# Patient Record
Sex: FEMALE | Race: WHITE | NOT HISPANIC OR LATINO | Employment: PART TIME | URBAN - METROPOLITAN AREA
[De-identification: names, ages, dates, MRNs, and addresses within clinical notes are randomized per-mention and may not be internally consistent; named-entity substitution may affect disease eponyms.]

---

## 2023-01-10 NOTE — PROGRESS NOTES
Assessment/Plan:  Pap every 3 years if normal, sexually transmitted infection testing as indicated, exercise most days of week, obtain appropriate diet and hydration, Calcium 1000mg + 600 vit D daily,  Annual mammogram starting at age 34, monthly breast self exam   Check if TSH done if not call and I will order  Constipation probiotic CHASTITY increase water Senna if needed          Diagnoses and all orders for this visit:    Encounter for gynecological examination without abnormal finding  -     Cancel: IGP, Aptima HPV, Rfx 16/18,45  -     IGP, Aptima HPV, Rfx 16/18,45    Encounter for Papanicolaou smear for cervical cancer screening  -     Cancel: IGP, Aptima HPV, Rfx 16/18,45  -     IGP, Aptima HPV, Rfx 16/18,45    FH: ovarian cancer    Breast cancer screening by mammogram  -     Mammo screening bilateral w 3d & cad; Future    Other orders  -     Liquid-based pap, screening  -     escitalopram (LEXAPRO) 10 mg tablet; Take 10 mg by mouth daily  -     Magnesium 200 MG CHEW; every 24 hours  -     Multiple Vitamins-Minerals (Multivitamin Women) TABS; Take by mouth  -     fluticasone (FLONASE) 50 mcg/act nasal spray; 1 spray into each nostril if needed for rhinitis          Subjective:      Patient ID: Feliciano Urbina is a 42 y o  female  New/ former pt here for annual gyn  (7 & 5) H/o LEEP in  normal paps since  Periods monthly regular  Gets menstrual headaches Not interested in bc  Vasectomy  Denies abd or pelvic pain  Constipation Bladder normal Weight gain  FH ovarian cancer MGM from [facility] PAP 2020 neg/neg       The following portions of the patient's history were reviewed and updated as appropriate: allergies, current medications, past family history, past medical history, past social history, past surgical history and problem list     Review of Systems   Constitutional: Positive for unexpected weight change  Negative for fatigue     Gastrointestinal: Negative for abdominal distention, abdominal pain, constipation and diarrhea  Genitourinary: Negative for difficulty urinating, dyspareunia, dysuria, frequency, genital sores, menstrual problem, pelvic pain, urgency, vaginal bleeding, vaginal discharge and vaginal pain  Neurological: Positive for headaches  Psychiatric/Behavioral: Negative  Negative for dysphoric mood  The patient is not nervous/anxious  Objective:      BP 98/52 (BP Location: Left arm, Patient Position: Sitting, Cuff Size: Standard)   Ht 5' 4 25" (1 632 m)   Wt 68 7 kg (151 lb 6 4 oz)   LMP 01/09/2023 (Exact Date)   Breastfeeding No   BMI 25 79 kg/m²          Physical Exam  Vitals and nursing note reviewed  Constitutional:       General: She is not in acute distress  Appearance: Normal appearance  HENT:      Head: Normocephalic and atraumatic  Pulmonary:      Effort: Pulmonary effort is normal    Chest:   Breasts:     Breasts are symmetrical       Right: Normal  No mass, nipple discharge, skin change or tenderness  Left: Normal  No mass, nipple discharge, skin change or tenderness  Abdominal:      General: There is no distension  Palpations: Abdomen is soft  Tenderness: There is no abdominal tenderness  There is no guarding or rebound  Genitourinary:     General: Normal vulva  Exam position: Lithotomy position  Labia:         Right: No rash, tenderness, lesion or injury  Left: No rash, tenderness, lesion or injury  Urethra: No prolapse, urethral pain, urethral swelling or urethral lesion  Vagina: Normal  No erythema or lesions  Cervix: No cervical motion tenderness, discharge, lesion or cervical bleeding  Uterus: Normal        Adnexa: Right adnexa normal and left adnexa normal         Right: No mass or tenderness  Left: No mass or tenderness  Rectum: No mass or external hemorrhoid  Comments: Light menses PAP from cervix  Musculoskeletal:         General: Normal range of motion  Lymphadenopathy:      Upper Body:      Right upper body: No axillary adenopathy  Left upper body: No axillary adenopathy  Lower Body: No right inguinal adenopathy  No left inguinal adenopathy  Skin:     General: Skin is warm and dry  Neurological:      Mental Status: She is alert and oriented to person, place, and time  Psychiatric:         Mood and Affect: Mood normal          Behavior: Behavior normal          Thought Content:  Thought content normal          Judgment: Judgment normal

## 2023-01-10 NOTE — PATIENT INSTRUCTIONS
Pap every 3 years if normal, sexually transmitted infection testing as indicated, exercise most days of week, obtain appropriate diet and hydration, Calcium 1000mg + 600 vit D daily,  Annual mammogram starting at age 34, monthly breast self exam   Check if TSH done if not call and I will order     Constipation probiotic CHASTITY increase water Senna if needed

## 2023-01-11 ENCOUNTER — ANNUAL EXAM (OUTPATIENT)
Dept: OBGYN CLINIC | Facility: CLINIC | Age: 38
End: 2023-01-11

## 2023-01-11 VITALS
DIASTOLIC BLOOD PRESSURE: 52 MMHG | HEIGHT: 64 IN | WEIGHT: 151.4 LBS | BODY MASS INDEX: 25.85 KG/M2 | SYSTOLIC BLOOD PRESSURE: 98 MMHG

## 2023-01-11 DIAGNOSIS — Z01.419 ENCOUNTER FOR GYNECOLOGICAL EXAMINATION WITHOUT ABNORMAL FINDING: Primary | ICD-10-CM

## 2023-01-11 DIAGNOSIS — Z80.41 FH: OVARIAN CANCER: ICD-10-CM

## 2023-01-11 DIAGNOSIS — Z12.31 BREAST CANCER SCREENING BY MAMMOGRAM: ICD-10-CM

## 2023-01-11 DIAGNOSIS — Z12.4 ENCOUNTER FOR PAPANICOLAOU SMEAR FOR CERVICAL CANCER SCREENING: ICD-10-CM

## 2023-01-11 RX ORDER — PHENOL 1.4 %
AEROSOL, SPRAY (ML) MUCOUS MEMBRANE
COMMUNITY

## 2023-01-11 RX ORDER — FLUTICASONE PROPIONATE 50 MCG
1 SPRAY, SUSPENSION (ML) NASAL AS NEEDED
COMMUNITY

## 2023-01-11 RX ORDER — ESCITALOPRAM OXALATE 10 MG/1
10 TABLET ORAL DAILY
COMMUNITY
Start: 2023-01-10

## 2023-01-15 LAB
CYTOLOGIST CVX/VAG CYTO: NORMAL
DX ICD CODE: NORMAL
HPV GENOTYPE REFLEX: NORMAL
HPV I/H RISK 4 DNA CVX QL PROBE+SIG AMP: NEGATIVE
OTHER STN SPEC: NORMAL
PATH REPORT.FINAL DX SPEC: NORMAL
SL AMB NOTE:: NORMAL
SL AMB SPECIMEN ADEQUACY: NORMAL
SL AMB TEST METHODOLOGY: NORMAL